# Patient Record
(demographics unavailable — no encounter records)

---

## 2024-12-26 NOTE — HISTORY OF PRESENT ILLNESS
[FreeTextEntry1] : Itzel is a pleasant 36 y/o F who presents today for axilla tingling and itching. She reports she has noticed intermittent tingling and itching at the top of her left axilla for a few weeks now. Denies any changed in deodorant or detergent. Unsure if she has injured her left shoulder as it also has been "bothering her" recently. 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 Denies pain worsening or improving with anything, and tingling and itching is intermittent. Denies any pain/tingling down her arms, denies chest pain, or SOB.

## 2024-12-26 NOTE — DISCUSSION/SUMMARY
[FreeTextEntry1] : Reviewed normal bilateral breast exam today. No irritation, infection, inflammation, tenderness, or masses noted on left breast or axilla. Recommended left breast mammo and complete Left BUS for further evaluation, pt states she does not want mammo at this time due to amount of radiation exposure with mammograms. We also reviewed if normal left BUS, to f/u with either PCP or ortho for further evaluation of intermittent left upper shoulder discomfort she has, to r/o nerve pain or other injury. Pt verbalized understanding. Follow up for annual GYN visit or sooner if needed. Instructed to call with questions or concerns.

## 2025-03-28 NOTE — HISTORY OF PRESENT ILLNESS
[Dull/Aching] : dull/aching [Sharp] : sharp [Stabbing] : stabbing [Frequent] : frequent [Full time] : Work status: full time [de-identified] : 03/26/2025:   35 Y F presenting today for an initial evaluation. C/o significant "pressure" in lower back. Onset 3 weeks prior but recent exacerbation the past 48 hours. Lumbar pains are central, R>L. C/o RT buttock pains. Intermittent burning sensation in back w/ stretching. No subjective weakness. No B/B disturbances.  No relief with OTC Advil.  [] : no [de-identified] : sit/stand motion [de-identified] : or worker at [employer]

## 2025-03-28 NOTE — PHYSICAL EXAM
[Flexion] : flexion [Extension] : extension [Bending to left] : bending to left [Bending to right] : bending to right [de-identified] : Constitutional: - General Appearance: Unremarkable Body Habitus Well Developed Well Nourished Body Habitus No Deformities Well Groomed Ability To communicate: Normal Neurologic: Global sensation is intact to upper and lower extremities. See examination of Neck and/or Spine for exceptions. Orientation to Time, Place and Person is: Normal Mood And Affect is Normal Skin: - Head/Face, Right Upper/Lower Extremity, Left Upper/Lower Extremity: Normal See Examination of Neck and/or Spine for exceptions Cardiovascular: Peripheral Cardiovascular System is Normal Palpation of Lymph Nodes: Normal Palpation of lymph nodes in: Axilla, Cervical, Inguinal Abnormal Palpation of lymph nodes in: None  [] : non-antalgic [FreeTextEntry8] : L4-S1 radiating pains to sciatic notch.  [FreeTextEntry9] : L>R back pains with BL bending. Less pain with FF & extension.  [TWNoteComboBox7] : forward flexion 15 degrees [de-identified] : extension 5 degrees [de-identified] : left lateral bending 5 degrees [de-identified] : right lateral bending 10 degrees

## 2025-03-28 NOTE — PHYSICAL EXAM
[Flexion] : flexion [Extension] : extension [Bending to left] : bending to left [Bending to right] : bending to right [de-identified] : Constitutional: - General Appearance: Unremarkable Body Habitus Well Developed Well Nourished Body Habitus No Deformities Well Groomed Ability To communicate: Normal Neurologic: Global sensation is intact to upper and lower extremities. See examination of Neck and/or Spine for exceptions. Orientation to Time, Place and Person is: Normal Mood And Affect is Normal Skin: - Head/Face, Right Upper/Lower Extremity, Left Upper/Lower Extremity: Normal See Examination of Neck and/or Spine for exceptions Cardiovascular: Peripheral Cardiovascular System is Normal Palpation of Lymph Nodes: Normal Palpation of lymph nodes in: Axilla, Cervical, Inguinal Abnormal Palpation of lymph nodes in: None  [] : non-antalgic [FreeTextEntry8] : L4-S1 radiating pains to sciatic notch.  [FreeTextEntry9] : L>R back pains with BL bending. Less pain with FF & extension.  [TWNoteComboBox7] : forward flexion 15 degrees [de-identified] : extension 5 degrees [de-identified] : left lateral bending 5 degrees [de-identified] : right lateral bending 10 degrees

## 2025-03-28 NOTE — DISCUSSION/SUMMARY
[de-identified] : 35 Y F w/ acute low back pain & myalgia. RT lumbar paraspinal TPI given today. Tolerated well. Patient was provided with a referral for lumbar physical therapy to work on stretching, strengthening and range of motion. Patient was provided with a lumbar home exercise program.   I am prescribing the patient Cyclobenzaprine HCl (10mg- take 1 tablet TID for 3 days then PRN) to aid in spasm control.  F/U in 4-6 weeks.   Prior to appointment and during encounter with patient extensive medical records were reviewed including but not limited to, hospital records, out patient records, imaging results, and lab data. During this appointment the patient was examined, diagnoses were discussed and explained in a face to face manner. In addition extensive time was spent reviewing aforementioned diagnostic studies. Counseling including abnormal image results, differential diagnoses, treatment options, risk and benefits, lifestyle changes, current condition, and current medications was performed. Patient's comments, questions, and concerns were address and patient verbalized understanding. Based on this patient's presentation at our office, which is an orthopedic spine surgeon's office, this patient inherently / intrinsically has a risk, however minute, of developing issues such as Cauda equina syndrome, bowel and bladder changes, or progression of motor or neurological deficits such as paralysis which may be permanent.    I, Enid Garcia, attest that this documentation has been prepared under the direction and in the presence of provider Shaun Soto MD.

## 2025-03-28 NOTE — PHYSICAL EXAM
[Flexion] : flexion [Extension] : extension [Bending to left] : bending to left [Bending to right] : bending to right [de-identified] : Constitutional: - General Appearance: Unremarkable Body Habitus Well Developed Well Nourished Body Habitus No Deformities Well Groomed Ability To communicate: Normal Neurologic: Global sensation is intact to upper and lower extremities. See examination of Neck and/or Spine for exceptions. Orientation to Time, Place and Person is: Normal Mood And Affect is Normal Skin: - Head/Face, Right Upper/Lower Extremity, Left Upper/Lower Extremity: Normal See Examination of Neck and/or Spine for exceptions Cardiovascular: Peripheral Cardiovascular System is Normal Palpation of Lymph Nodes: Normal Palpation of lymph nodes in: Axilla, Cervical, Inguinal Abnormal Palpation of lymph nodes in: None  [] : non-antalgic [FreeTextEntry8] : L4-S1 radiating pains to sciatic notch.  [FreeTextEntry9] : L>R back pains with BL bending. Less pain with FF & extension.  [TWNoteComboBox7] : forward flexion 15 degrees [de-identified] : extension 5 degrees [de-identified] : left lateral bending 5 degrees [de-identified] : right lateral bending 10 degrees

## 2025-03-28 NOTE — DISCUSSION/SUMMARY
[de-identified] : 35 Y F w/ acute low back pain & myalgia. RT lumbar paraspinal TPI given today. Tolerated well. Patient was provided with a referral for lumbar physical therapy to work on stretching, strengthening and range of motion. Patient was provided with a lumbar home exercise program.   I am prescribing the patient Cyclobenzaprine HCl (10mg- take 1 tablet TID for 3 days then PRN) to aid in spasm control.  F/U in 4-6 weeks.   Prior to appointment and during encounter with patient extensive medical records were reviewed including but not limited to, hospital records, out patient records, imaging results, and lab data. During this appointment the patient was examined, diagnoses were discussed and explained in a face to face manner. In addition extensive time was spent reviewing aforementioned diagnostic studies. Counseling including abnormal image results, differential diagnoses, treatment options, risk and benefits, lifestyle changes, current condition, and current medications was performed. Patient's comments, questions, and concerns were address and patient verbalized understanding. Based on this patient's presentation at our office, which is an orthopedic spine surgeon's office, this patient inherently / intrinsically has a risk, however minute, of developing issues such as Cauda equina syndrome, bowel and bladder changes, or progression of motor or neurological deficits such as paralysis which may be permanent.    I, Enid Garcia, attest that this documentation has been prepared under the direction and in the presence of provider Shaun Soto MD.

## 2025-03-28 NOTE — PROCEDURE
[FreeTextEntry1] : TPI [FreeTextEntry2] : MYALGIA [FreeTextEntry3] :  Discussed risks, benefits, and alternatives as well as contents of injection. Risks include, but are not limited allergic reaction, flare reaction, injection site pain, bruising, numbness, increased blood sugar, skin discoloration, fat atrophy, tendon rupture, and infection. Patient understands and would like to proceed with injection.  The skin over the RT LUMBAR PARASPINAL was cleansed with Betadine and anesthetized with ethyl chloride. 1 cc 40mg of Kenalog and 4 cc of 0.5% bupivacaine were injected.  Site was dressed with a band-aid. Patient tolerated the procedure well.  Advised to use ice packs every 20 min for the next 24h.

## 2025-03-28 NOTE — DATA REVIEWED
[FreeTextEntry1] : On my interpretations of these images from [facility] in house on 03/26/2025: I have independently reviewed and interpreted these images. 2 V lumbar XR- L1-2 mild DDD, otherwise NL.

## 2025-03-28 NOTE — HISTORY OF PRESENT ILLNESS
[Dull/Aching] : dull/aching [Sharp] : sharp [Stabbing] : stabbing [Frequent] : frequent [Full time] : Work status: full time [de-identified] : 03/26/2025:   35 Y F presenting today for an initial evaluation. C/o significant "pressure" in lower back. Onset 3 weeks prior but recent exacerbation the past 48 hours. Lumbar pains are central, R>L. C/o RT buttock pains. Intermittent burning sensation in back w/ stretching. No subjective weakness. No B/B disturbances.  No relief with OTC Advil.  [] : no [de-identified] : sit/stand motion [de-identified] : or worker at [facility] [facility]

## 2025-03-28 NOTE — HISTORY OF PRESENT ILLNESS
[Dull/Aching] : dull/aching [Sharp] : sharp [Stabbing] : stabbing [Frequent] : frequent [Full time] : Work status: full time [de-identified] : 03/26/2025:   35 Y F presenting today for an initial evaluation. C/o significant "pressure" in lower back. Onset 3 weeks prior but recent exacerbation the past 48 hours. Lumbar pains are central, R>L. C/o RT buttock pains. Intermittent burning sensation in back w/ stretching. No subjective weakness. No B/B disturbances.  No relief with OTC Advil.  [] : no [de-identified] : sit/stand motion [de-identified] : or worker at [facility]

## 2025-03-28 NOTE — DISCUSSION/SUMMARY
[de-identified] : 35 Y F w/ acute low back pain & myalgia. RT lumbar paraspinal TPI given today. Tolerated well. Patient was provided with a referral for lumbar physical therapy to work on stretching, strengthening and range of motion. Patient was provided with a lumbar home exercise program.   I am prescribing the patient Cyclobenzaprine HCl (10mg- take 1 tablet TID for 3 days then PRN) to aid in spasm control.  F/U in 4-6 weeks.   Prior to appointment and during encounter with patient extensive medical records were reviewed including but not limited to, hospital records, out patient records, imaging results, and lab data. During this appointment the patient was examined, diagnoses were discussed and explained in a face to face manner. In addition extensive time was spent reviewing aforementioned diagnostic studies. Counseling including abnormal image results, differential diagnoses, treatment options, risk and benefits, lifestyle changes, current condition, and current medications was performed. Patient's comments, questions, and concerns were address and patient verbalized understanding. Based on this patient's presentation at our office, which is an orthopedic spine surgeon's office, this patient inherently / intrinsically has a risk, however minute, of developing issues such as Cauda equina syndrome, bowel and bladder changes, or progression of motor or neurological deficits such as paralysis which may be permanent.    I, Enid Garcia, attest that this documentation has been prepared under the direction and in the presence of provider Shaun Soto MD.

## 2025-04-07 NOTE — HISTORY OF PRESENT ILLNESS
[FreeTextEntry1] : 34 yo  here for annual exam getting  in june, delvin nick to concieve after has sl irreg period, 2had one very late period 50 days.  has had us sugg pcos in past.  hasd bw at [facility] last yr, showed amh 6, fsh 2.7 (sl low)   n btb, pwl pain no gyn co.  uses condoms.  i

## 2025-04-07 NOTE — DISCUSSION/SUMMARY
[FreeTextEntry1] : irreg periods check day 3 bw  rec inositol, mucinex at ovulation, dhea supp.   call w results.

## 2025-04-14 NOTE — HISTORY OF PRESENT ILLNESS
[Dull/Aching] : dull/aching [Sharp] : sharp [Stabbing] : stabbing [Frequent] : frequent [Full time] : Work status: full time [de-identified] : 04/14/2025: Patient presenting today for a FUV. She was improving until exacerbation on 4/12/25 after she leaned over to reach for object, felt a "shift" and states being unable to stand up right. Now standing w/ RT sided tilt. She was treating with HEP and guidance from PTs (her in-laws). Back pain radiating to R thigh. Pins & needles and RT sided back/buttock. Weakness and paresthesia's are acute. Seen at ED, RX'd Decatron, prednisolone, & muscle relaxer.   03/26/2025:   35 Y F presenting today for an initial evaluation. C/o significant "pressure" in lower back. Onset 3 weeks prior but recent exacerbation the past 48 hours. Lumbar pains are central, R>L. C/o RT buttock pains. Intermittent burning sensation in back w/ stretching. No subjective weakness. No B/B disturbances.  No relief with OTC Advil.  [] : no [de-identified] : sit/stand motion [de-identified] : or worker at [facility]

## 2025-04-14 NOTE — PHYSICAL EXAM
[Flexion] : flexion [Extension] : extension [Bending to left] : bending to left [Bending to right] : bending to right [de-identified] : Constitutional: - General Appearance: Unremarkable Body Habitus Well Developed Well Nourished Body Habitus No Deformities Well Groomed Ability To communicate: Normal Neurologic: Global sensation is intact to upper and lower extremities. See examination of Neck and/or Spine for exceptions. Orientation to Time, Place and Person is: Normal Mood And Affect is Normal Skin: - Head/Face, Right Upper/Lower Extremity, Left Upper/Lower Extremity: Normal See Examination of Neck and/or Spine for exceptions Cardiovascular: Peripheral Cardiovascular System is Normal Palpation of Lymph Nodes: Normal Palpation of lymph nodes in: Axilla, Cervical, Inguinal Abnormal Palpation of lymph nodes in: None  [Absent] : patella reflex absent [] : non-antalgic [de-identified] : L4 RT paresthesia's

## 2025-04-14 NOTE — PHYSICAL EXAM
[Flexion] : flexion [Extension] : extension [Bending to left] : bending to left [Bending to right] : bending to right [de-identified] : Constitutional: - General Appearance: Unremarkable Body Habitus Well Developed Well Nourished Body Habitus No Deformities Well Groomed Ability To communicate: Normal Neurologic: Global sensation is intact to upper and lower extremities. See examination of Neck and/or Spine for exceptions. Orientation to Time, Place and Person is: Normal Mood And Affect is Normal Skin: - Head/Face, Right Upper/Lower Extremity, Left Upper/Lower Extremity: Normal See Examination of Neck and/or Spine for exceptions Cardiovascular: Peripheral Cardiovascular System is Normal Palpation of Lymph Nodes: Normal Palpation of lymph nodes in: Axilla, Cervical, Inguinal Abnormal Palpation of lymph nodes in: None  [Absent] : patella reflex absent [] : non-antalgic [de-identified] : L4 RT paresthesia's

## 2025-04-14 NOTE — HISTORY OF PRESENT ILLNESS
[Dull/Aching] : dull/aching [Sharp] : sharp [Stabbing] : stabbing [Frequent] : frequent [Full time] : Work status: full time [de-identified] : 04/14/2025: Patient presenting today for a FUV. She was improving until exacerbation on 4/12/25 after she leaned over to reach for object, felt a "shift" and states being unable to stand up right. Now standing w/ RT sided tilt. She was treating with HEP and guidance from PTs (her in-laws). Back pain radiating to R thigh. Pins & needles and RT sided back/buttock. Weakness and paresthesia's are acute. Seen at ED, RX'd Decatron, prednisolone, & muscle relaxer.   03/26/2025:   35 Y F presenting today for an initial evaluation. C/o significant "pressure" in lower back. Onset 3 weeks prior but recent exacerbation the past 48 hours. Lumbar pains are central, R>L. C/o RT buttock pains. Intermittent burning sensation in back w/ stretching. No subjective weakness. No B/B disturbances.  No relief with OTC Advil.  [] : no [de-identified] : sit/stand motion [de-identified] : or worker at [facility] [employer]

## 2025-04-14 NOTE — DISCUSSION/SUMMARY
[de-identified] : 35 Y F w/ lumbar radiculopathy, progressive symptoms, refrac to NSAIDs, TPIs, prednisolone & HEP >4 weeks, I am requesting a STAT lumbar MRI to eval for HNP.   F/U once MRI is completed.   Prior to appointment and during encounter with patient extensive medical records were reviewed including but not limited to, hospital records, out patient records, imaging results, and lab data. During this appointment the patient was examined, diagnoses were discussed and explained in a face to face manner. In addition extensive time was spent reviewing aforementioned diagnostic studies. Counseling including abnormal image results, differential diagnoses, treatment options, risk and benefits, lifestyle changes, current condition, and current medications was performed. Patient's comments, questions, and concerns were address and patient verbalized understanding. Based on this patient's presentation at our office, which is an orthopedic spine surgeon's office, this patient inherently / intrinsically has a risk, however minute, of developing issues such as Cauda equina syndrome, bowel and bladder changes, or progression of motor or neurological deficits such as paralysis which may be permanent.    I, Enid Garcia, attest that this documentation has been prepared under the direction and in the presence of provider Shaun Soto MD.

## 2025-04-14 NOTE — DISCUSSION/SUMMARY
[de-identified] : 35 Y F w/ lumbar radiculopathy, progressive symptoms, refrac to NSAIDs, TPIs, prednisolone & HEP >4 weeks, I am requesting a STAT lumbar MRI to eval for HNP.   F/U once MRI is completed.   Prior to appointment and during encounter with patient extensive medical records were reviewed including but not limited to, hospital records, out patient records, imaging results, and lab data. During this appointment the patient was examined, diagnoses were discussed and explained in a face to face manner. In addition extensive time was spent reviewing aforementioned diagnostic studies. Counseling including abnormal image results, differential diagnoses, treatment options, risk and benefits, lifestyle changes, current condition, and current medications was performed. Patient's comments, questions, and concerns were address and patient verbalized understanding. Based on this patient's presentation at our office, which is an orthopedic spine surgeon's office, this patient inherently / intrinsically has a risk, however minute, of developing issues such as Cauda equina syndrome, bowel and bladder changes, or progression of motor or neurological deficits such as paralysis which may be permanent.    I, Enid Garcia, attest that this documentation has been prepared under the direction and in the presence of provider Shaun Soto MD.

## 2025-05-01 NOTE — PLAN
[FreeTextEntry1] : Patient presents to establish care.  Labs reviewed from 2024.  TSH, CMP wnl.  Found to have anemia on cbc with very low iron/ferritin.  TIBC high.   #abdominal pain #abnormal stool #GERD severe abdominal pain, mostly in the setting of caffeine intake.  Had a negative EGD and u/s recently. Unclear if MSK, gas pain, or an ulcer Will do stool studies. Will order BMP to assess for cr function in the event she needs a CT scan (Ct ordered at this time). will trial Pantoprazole 40mg qd for one month will also trial gas x and natural fibers Will avoid caffeine, NSAID's and acidic foods. will f/u with GI  #iron def anemia noted to have iron def anemia did not tolerate iron pills in the past. Will refer to hematology for iron infusions.

## 2025-05-01 NOTE — HEALTH RISK ASSESSMENT
[Yes] : Yes [Monthly or less (1 pt)] : Monthly or less (1 point) [3 or 4 (1 pt)] : 3 or 4  (1 point) [No falls in past year] : Patient reported no falls in the past year [Little interest or pleasure doing things] : 1) Little interest or pleasure doing things [Feeling down, depressed, or hopeless] : 2) Feeling down, depressed, or hopeless [0] : 2) Feeling down, depressed, or hopeless: Not at all (0) [PHQ-2 Negative - No further assessment needed] : PHQ-2 Negative - No further assessment needed [Former] : Former [5-9] : 5-9 [< 15 Years] : < 15 Years [[id number]] : 0 [de-identified] : quit 4 years; 0.5PPD for 12 years

## 2025-05-01 NOTE — PHYSICAL EXAM
[No Acute Distress] : no acute distress [Well Nourished] : well nourished [Well Developed] : well developed [Well-Appearing] : well-appearing [Normal Sclera/Conjunctiva] : normal sclera/conjunctiva [Normal Outer Ear/Nose] : the outer ears and nose were normal in appearance [No Respiratory Distress] : no respiratory distress  [No Accessory Muscle Use] : no accessory muscle use [Clear to Auscultation] : lungs were clear to auscultation bilaterally [Normal Rate] : normal rate  [Regular Rhythm] : with a regular rhythm [Normal S1, S2] : normal S1 and S2 [No Murmur] : no murmur heard [No Edema] : there was no peripheral edema [Soft] : abdomen soft [Normal Gait] : normal gait [Normal Affect] : the affect was normal [Normal Insight/Judgement] : insight and judgment were intact [de-identified] : diffuse TTP. hyperactive bowel sounds

## 2025-05-01 NOTE — HISTORY OF PRESENT ILLNESS
[FreeTextEntry8] : Patient presents as new patient to establish care. 34 yo F w/ PMhx of PCOS, GERD, iron def anemia She has been having severe abdominal pain for the past few days.  She had severe pain after coffee with reflux.  She also gets random pain.  Sometimes the pain is worse with movement. Had some dark stools a few days ago.   had EGD and colonoscopy for stomach pain and GERD in 2023.  Was diagnosed with metaplasia and given PPI.  Did not really take the PPI but the metaplasia went away. Had a repeat EGD in 2025.   Had an u/s this year and was negative. Labs reviewed from 2024.  TSH, CMP wnl.  Found to have anemia on cbc with very low iron/ferritin.  TIBC high.

## 2025-06-02 NOTE — PLAN
[FreeTextEntry1] : We reviewed her lab work findings and discussed the typical approach to infertility, she is now 35 years old so was advised that if she has not gotten pregnant after 6 months of trying that we would move onto further workup including a semen analysis and a hysterosalpingogram.  The patient is on inositol, prenatal vitamins.  We discussed avoidance of nonsteroidals at the time of ovulation and also considering using Mucinex at the time she is positive.  She has a system that is testing her hormone levels through her urine, because ovulation predictor kits have not worked well for her in the past, and this urinary hormonal panel that she has been using suggests that her progesterone levels may be low.  I put in orders for her to have her day 3 FSH estradiol and anti-mullerian hormone levels drawn again.  We also discussed strategies to reduce the amount of bleeding she gets in case she gets that at her wedding later this month, including nonsteroidals for the week before her period, and at that I agreed to put in the Lysteda for her so that if it turns out that she will be having a heavy day of her period on her wedding day she can use Lysteda to make it lighter.  We spent 24 minutes on this telemedicine conference.

## 2025-06-02 NOTE — HISTORY OF PRESENT ILLNESS
[FreeTextEntry1] : This 35-year-old P0 consents to telemedicine visit via [employer]aval[employer] teleconferencing keyur, she was located at her home in [place], I was located in my office in [place]. She is getting  later this month and has been trying to get pregnant for the past 3 months.  She has had slightly irregular periods usually q. 23 to 35 days.  She has had ultrasound suggestive of PCOS in the past.  She had recent blood work which showed a normal FSH of 5 and a low anti-mullerian hormone level of 0.6.  Last year she had an AMH of 6 and an FSH of 2.7, her estrogen level was low at that time at 15, but her recent estrogen level came back at 31 which was in the normal range.  She wanted to discuss the significance of these results and also how we would approach her workup if she does not become pregnant in the next few months.